# Patient Record
Sex: FEMALE | Race: WHITE | NOT HISPANIC OR LATINO | Employment: FULL TIME | URBAN - METROPOLITAN AREA
[De-identification: names, ages, dates, MRNs, and addresses within clinical notes are randomized per-mention and may not be internally consistent; named-entity substitution may affect disease eponyms.]

---

## 2022-08-16 RX ORDER — POTASSIUM CHLORIDE 1500 MG/1
20 TABLET, EXTENDED RELEASE ORAL ONCE
COMMUNITY
End: 2022-08-22

## 2022-08-22 ENCOUNTER — OFFICE VISIT (OUTPATIENT)
Dept: PLASTIC SURGERY | Facility: CLINIC | Age: 37
End: 2022-08-22
Payer: MEDICARE

## 2022-08-22 VITALS
HEIGHT: 66 IN | SYSTOLIC BLOOD PRESSURE: 113 MMHG | TEMPERATURE: 97 F | HEART RATE: 84 BPM | OXYGEN SATURATION: 97 % | BODY MASS INDEX: 21.38 KG/M2 | WEIGHT: 133 LBS | RESPIRATION RATE: 18 BRPM | DIASTOLIC BLOOD PRESSURE: 75 MMHG

## 2022-08-22 DIAGNOSIS — N64.81 BREAST PTOSIS: ICD-10-CM

## 2022-08-22 DIAGNOSIS — N62 MACROMASTIA: Primary | ICD-10-CM

## 2022-08-22 PROCEDURE — 99204 PR OFFICE/OUTPT VISIT, NEW, LEVL IV, 45-59 MIN: ICD-10-PCS | Mod: S$GLB,,, | Performed by: SURGERY

## 2022-08-22 PROCEDURE — 99204 OFFICE O/P NEW MOD 45 MIN: CPT | Mod: S$GLB,,, | Performed by: SURGERY

## 2022-08-22 NOTE — PROGRESS NOTES
REFERRAL FOR BREAST REDUCTION                 8/22/2022  Chief Complaint  Large breasts     Referring provider: self     HPI  Ade Streeter is a 37 y.o. female.  She currently wears a 34DD cup bra and prefers to be smaller.   She has attempted to mitigate symptoms with weight loss, physical therapychiropracter, analgesics, and wearing extra bras.  Her weight has not been stable for 6 months.Pt has lost 60 pounds in the last 5 months. She has had 3 back surgeries, with back spinal stimulator in place in 2020.      She denies breast mass or skin change.   Personal hx of breast biopsy: yes   Personalfamily history of breastovarian cancer: great aunt with breast cancer     She reports associated complaints of:  Headache  Back pain  Neck pain     She works as a:      Tobacco use: former smoker  Marijuana use: no        PMH       Past Medical History:   Diagnosis Date    ADD (attention deficit disorder)      Anxiety disorder, unspecified      Bipolar affective disorder      Depression      Insomnia      Migraines      Unspecified hemorrhoids              PSH        Past Surgical History:   Procedure Laterality Date    BACK SURGERY        HYSTERECTOMY        LAMINECTOMY        SPINAL CORD STIMULATOR IMPLANT             OBSTETRIC HISTORY  OB History    No obstetric history on file.            FH        Family History   Problem Relation Age of Onset    Mental illness Father      Anxiety disorder Father      Alzheimer's disease Maternal Grandmother      Colon cancer Maternal Grandmother      Dementia Maternal Grandmother      Stroke Maternal Grandmother      Pancreatic cancer Maternal Grandfather      Mental illness Paternal Grandmother      Cancer Paternal Grandmother      Cancer Paternal Grandfather           MEDICATIONS         Outpatient Medications Marked as Taking for the 8/22/22 encounter (Office Visit) with Feli Mccrary MD   Medication Sig Dispense Refill     "dextroamphetamine/amphetamine (ADDERALL ORAL) Take by mouth.        duloxetine HCl (DULOXETINE ORAL) Take by mouth.        ergocalciferol, vitamin D2, (VITAMIN D2 ORAL) Take by mouth.        LAMOTRIGINE ORAL Take by mouth.        ondansetron HCl (ZOFRAN ORAL) Take by mouth.        propranolol HCl (PROPRANOLOL ORAL) Take by mouth.        TEMAZEPAM ORAL Take by mouth.        trazodone HCl (TRAZODONE ORAL) Take by mouth.        [DISCONTINUED] linaclotide (LINZESS ORAL) Take by mouth.        [DISCONTINUED] potassium chloride (K-TAB) 20 mEq Take 20 mEq by mouth once.        [DISCONTINUED] pregabalin (LYRICA ORAL) Take by mouth.        [DISCONTINUED] promethazine HCl (PHENERGAN RECT) Place rectally.             ALLERGIES        Review of patient's allergies indicates:   Allergen Reactions    Adhesive Blisters, Itching and Rash         SOCIAL HISTORY  Tobacco:   Social History          Tobacco Use   Smoking Status Former Smoker   Smokeless Tobacco Never Used      EtOH:   Social History          Substance and Sexual Activity   Alcohol Use Never            ROS  Review of Systems   Constitutional: Negative for chills, fever and malaise/fatigue.   HENT: Negative for congestion.    Eyes: Negative for blurred vision and double vision.   Respiratory: Negative for cough and sputum production.    Cardiovascular: Negative for chest pain and palpitations.   Gastrointestinal: Negative for nausea and vomiting.   Genitourinary: Negative for dysuria and hematuria.   Musculoskeletal: Positive for back pain and neck pain. Negative for joint pain.   Skin: Negative for itching and rash.   Neurological: Negative for dizziness, seizures and headaches.   Psychiatric/Behavioral: Negative for depression. The patient is not nervous/anxious.             PHYSICAL EXAM  /75   Pulse 84   Temp 97.3 °F (36.3 °C)   Resp 18   Ht 5' 6" (1.676 m)   Wt 60.3 kg (133 lb)   SpO2 97%   BMI 21.47 kg/m²   Body mass index is 21.47 " "kg/m².  Estimated body surface area is 1.68 meters squared as calculated from the following:    Height as of this encounter: 5' 6" (1.676 m).    Weight as of this encounter: 60.3 kg (133 lb).     Constitutional: Pt is oriented to person, place, and time.  Pt appears well-developed and well-nourished.   HENT: Normocephalic and atraumatic.   Pulmonary/Chest: Effort normal. No respiratory distress.   Abdomen: Soft. Non-tender. No masses or distension.  Musculoskeletal: Normal range of motion. Pt exhibits no edema or deformity.   Neurological: Pt is alert and oriented to person, place, and time. No sensory deficit. Exhibits normal muscle tone.   Skin: Skin is warm. No rash noted. No erythema.            BREASTS  Asymmetry in size  Ptosis Grade 3  No Masses or skin change appreciated in either breast.  No lymphadenopathy  No active Excoriation or skin discoloration inframammary fold or midline  Shoulder grooving None  Trapezial Hypertrophy None  Areola widened, no nipple discharge elicited. Nipple sensation present.  Lipodystrophy: Axillary   No Breast scars     Measurements  R SN to N-31  L SN to N-31     R N to IMF-10  L N to IMF-10        PHOTOS            ASSESSMENT  37 y.o. female with Macromastia and breast ptosis  Her desired size is C, and with estimated reduction in size we would be more in a B cup range. She is seemingly more in need of lift based on this first discussion. Will provide fees for mastopexy w small reduction.         If true medical reduction is pursued, we discussed indications for surgery and I believe she would greatly benefit from a reduction of 370g in each breast at least. We discussed a superior pedicle and keyhole pattern skin reduction to restore nipple position and achieve desired goal. The location of her scars were demonstrated today. We did discuss that her size would not be guaranteed, however we would ensure that her breasts are proportional to her chest frame.      Preoperative, " perioperative and postoperative plans were discussed in detail. Risks of surgery and alternatives to surgery were also discussed today.       She is >35 years of age with no known family history of breast cancer or elevated personal risk. We will pursue preoperative mammogram.     She understands that any breast tissue removed during surgery will be weighed and sent to pathology for a thorough evaluation. She understands that this is not a cancer operation and that if atypicalpremalignantor malignant cells are found, further surgery may be warranted after the appropriate consultations with surgical oncology are completed.      She has considered these options and she would like to proceed with breast reduction/ breast lift.   We will provide add on professional fees for her desired fat grafting to shoulder grooving, liposuction of axillary and chest wall lipodystrophy.   We took photos today to obtain pre-authorization for this clinically indicated procedure.  All of her questions were answered today to her satisfaction.       Needs mammogram prior to surgery  Needs to decide on the size she wants. She is ok with reducing her breast size in order to help with her back issues.   Would need to do light duty for atleast 2 weeks   We will send her quotes for BR vs possible lift   Return in 2-3 weeks to decide if she is sure about the reduction    Feli Mccrary MD FACS

## 2022-08-22 NOTE — PROGRESS NOTES
"      REFERRAL FOR BREAST REDUCTION            8/22/2022  Chief Complaint  Large breasts    Referring provider: *** Primary Doctor No    HPI  Ade Streeter is a 37 y.o. female.  She currently wears a 34DD cup bra and prefers to be smaller.   She has attempted to mitigate symptoms with weight loss, physical therapychiropracter, analgesics, and wearing extra bras.  Her weight has not been stable for 6 months.Pt has lost 60 pounds in the last 5 months.    She denies breast mass or skin change.   Personal hx of breast biopsy: yes   Personalfamily history of breastovarian cancer: great aunt with breast cancer    She reports associated complaints of:  Headache  Back pain  Neck pain  Skin excoriationrash unresponsive to medication  Restricted physical activity  Feeling "hunched over"  Chronic breast pain secondary to weight  Shoulder grooving     She works as a:     Tobacco use: former smoker  Marijuana use: no      PMH  Past Medical History:   Diagnosis Date    ADD (attention deficit disorder)     Anxiety disorder, unspecified     Bipolar affective disorder     Depression     Insomnia     Migraines     Unspecified hemorrhoids          PSH  Past Surgical History:   Procedure Laterality Date    BACK SURGERY      HYSTERECTOMY      LAMINECTOMY      SPINAL CORD STIMULATOR IMPLANT         OBSTETRIC HISTORY  OB History    No obstetric history on file.         FH  Family History   Problem Relation Age of Onset    Mental illness Father     Anxiety disorder Father     Alzheimer's disease Maternal Grandmother     Colon cancer Maternal Grandmother     Dementia Maternal Grandmother     Stroke Maternal Grandmother     Pancreatic cancer Maternal Grandfather     Mental illness Paternal Grandmother     Cancer Paternal Grandmother     Cancer Paternal Grandfather        MEDICATIONS  Outpatient Medications Marked as Taking for the 8/22/22 encounter (Office Visit) with Feli Mccrary MD " "  Medication Sig Dispense Refill    dextroamphetamine/amphetamine (ADDERALL ORAL) Take by mouth.      duloxetine HCl (DULOXETINE ORAL) Take by mouth.      ergocalciferol, vitamin D2, (VITAMIN D2 ORAL) Take by mouth.      LAMOTRIGINE ORAL Take by mouth.      ondansetron HCl (ZOFRAN ORAL) Take by mouth.      propranolol HCl (PROPRANOLOL ORAL) Take by mouth.      TEMAZEPAM ORAL Take by mouth.      trazodone HCl (TRAZODONE ORAL) Take by mouth.      [DISCONTINUED] linaclotide (LINZESS ORAL) Take by mouth.      [DISCONTINUED] potassium chloride (K-TAB) 20 mEq Take 20 mEq by mouth once.      [DISCONTINUED] pregabalin (LYRICA ORAL) Take by mouth.      [DISCONTINUED] promethazine HCl (PHENERGAN RECT) Place rectally.         ALLERGIES   Review of patient's allergies indicates:   Allergen Reactions    Adhesive Blisters, Itching and Rash       SOCIAL HISTORY  Tobacco:   Social History     Tobacco Use   Smoking Status Former Smoker   Smokeless Tobacco Never Used     EtOH:   Social History     Substance and Sexual Activity   Alcohol Use Never         ROS  Review of Systems   Constitutional: Negative for chills, fever and malaise/fatigue.   HENT: Negative for congestion.    Eyes: Negative for blurred vision and double vision.   Respiratory: Negative for cough and sputum production.    Cardiovascular: Negative for chest pain and palpitations.   Gastrointestinal: Negative for nausea and vomiting.   Genitourinary: Negative for dysuria and hematuria.   Musculoskeletal: Positive for back pain and neck pain. Negative for joint pain.   Skin: Negative for itching and rash.   Neurological: Negative for dizziness, seizures and headaches.   Psychiatric/Behavioral: Negative for depression. The patient is not nervous/anxious.          PHYSICAL EXAM  /75   Pulse 84   Temp 97.3 °F (36.3 °C)   Resp 18   Ht 5' 6" (1.676 m)   Wt 60.3 kg (133 lb)   SpO2 97%   BMI 21.47 kg/m²   Body mass index is 21.47 kg/m².  Estimated body " "surface area is 1.68 meters squared as calculated from the following:    Height as of this encounter: 5' 6" (1.676 m).    Weight as of this encounter: 60.3 kg (133 lb).    Constitutional: Pt is oriented to person, place, and time.  Pt appears well-developed and well-nourished.   HENT: Normocephalic and atraumatic.   Pulmonary/Chest: Effort normal. No respiratory distress.   Abdomen: Soft. Non-tender. No masses or distension.  Musculoskeletal: Normal range of motion. Pt exhibits no edema or deformity.   Neurological: Pt is alert and oriented to person, place, and time. No sensory deficit. Exhibits normal muscle tone.   Skin: Skin is warm. No rash noted. No erythema.         BREASTS  Asymmetry in size   Ptosis Grade 3  No Masses or skin change appreciated in either breast.  No lymphadenopathy  No active Excoriation or skin discoloration inframammary fold or midline  Shoulder grooving present  Trapezial Hypertrophy present  Areola widened, no nipple discharge elicited. Nipple sensation present.  Lipodystrophy: Axillary roll, lateral chest wall  No Breast scars    Measurements  R SN to N  L SN to N    R N to IMF  L N to IMF      PHOTOS      ASSESSMENT  37 y.o. female with Macromastia, symptomatic > 6 months with sufficient evidence of functional impairments related to breast weight.       Today we discussed indications for surgery and I believe she would greatly benefit from a reduction of ***g in each breast at least. We discussed a superior pedicle and keyhole pattern skin reduction to restore nipple position and achieve desired goal. The location of her scars were demonstrated today. We did discuss that her size would not be guaranteed, however we would ensure that her breasts are proportional to her chest frame.      Preoperative, perioperative and postoperative plans were discussed in detail. Risks of surgery and alternatives to surgery were also discussed today.     ***The influence of nicotine on her " postoperative outcomes was discussed in detail today for 5 minutes including but not limited to poor wound healing, infections, scarring, andor nipple loss. She will need to stop tobacco use for 4 weeks prior to proceeding with reduction. She is amenable to this and will return for urine testing prior to authorizations obtained and case scheduled. A referral was placed to smoking cessation clinician for assistance.     ***The influence of her BMI on her postoperative outcomes was discussed in detail today including but not limited to poor wound healing, infections, scarring, andor nipple loss. She does report a hindrance to proper exercise secondary to breast size and paindiscomfort, so I believe proceeding with reduction first would be beneficial for her. She understands risks and would like to proceed.     ***She is >35 years of age with ***known family history of breast cancer and elevated personal risk. We discussed at length options for her being increased surveillance, hormonal therapy, and risk reductive surgery. After our discussion, We both agree that mastectomy would be aggressive and side effect profile of hormonal therapy at this point would be undesirable. I did  her through plan of eventual reconstructive options if she were to later change her mind and desire risk reductive surgery andor a atypical, premalignant or malignant lesion was detected. She has considered these options and she would like to proceed with breast reduction.     ***She is <35 years of age with no known family history of breast cancer or elevated personal risk. We will not pursue preoperative mammogram, but will establish baseline 6 mo after surgery andor at age 33.     ***She is >35 years of age with no known family history of breast cancer or elevated personal risk. We will pursue preoperative mammogram.    She understands that any breast tissue removed during surgery will be weighed and sent to pathology for a  thorough evaluation. She understands that this is not a cancer operation and that if atypicalpremalignantor malignant cells are found, further surgery may be warranted after the appropriate consultations with surgical oncology are completed.     She has considered these options and she would like to proceed with breast reduction.   We will provide add on professional fees for her desired fat grafting to shoulder grooving, liposuction of axillary and chest wall lipodystrophy.   We took photos today to obtain pre-authorization for this clinically indicated procedure.  All of her questions were answered today to her satisfaction.       Feli Mccrary MD FACS